# Patient Record
Sex: FEMALE | Race: WHITE | NOT HISPANIC OR LATINO
[De-identification: names, ages, dates, MRNs, and addresses within clinical notes are randomized per-mention and may not be internally consistent; named-entity substitution may affect disease eponyms.]

---

## 2018-11-25 ENCOUNTER — TRANSCRIPTION ENCOUNTER (OUTPATIENT)
Age: 23
End: 2018-11-25

## 2020-02-20 ENCOUNTER — TRANSCRIPTION ENCOUNTER (OUTPATIENT)
Age: 25
End: 2020-02-20

## 2021-06-28 ENCOUNTER — TRANSCRIPTION ENCOUNTER (OUTPATIENT)
Age: 26
End: 2021-06-28

## 2022-01-12 ENCOUNTER — TRANSCRIPTION ENCOUNTER (OUTPATIENT)
Age: 27
End: 2022-01-12

## 2022-11-14 ENCOUNTER — EMERGENCY (EMERGENCY)
Facility: HOSPITAL | Age: 27
LOS: 0 days | Discharge: HOME | End: 2022-11-14
Attending: EMERGENCY MEDICINE | Admitting: EMERGENCY MEDICINE

## 2022-11-14 VITALS
OXYGEN SATURATION: 100 % | HEART RATE: 98 BPM | DIASTOLIC BLOOD PRESSURE: 75 MMHG | SYSTOLIC BLOOD PRESSURE: 130 MMHG | RESPIRATION RATE: 18 BRPM

## 2022-11-14 VITALS
OXYGEN SATURATION: 99 % | SYSTOLIC BLOOD PRESSURE: 185 MMHG | TEMPERATURE: 98 F | DIASTOLIC BLOOD PRESSURE: 79 MMHG | HEART RATE: 115 BPM | HEIGHT: 67 IN | WEIGHT: 220.02 LBS | RESPIRATION RATE: 18 BRPM

## 2022-11-14 DIAGNOSIS — M25.572 PAIN IN LEFT ANKLE AND JOINTS OF LEFT FOOT: ICD-10-CM
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
## 2022-11-14 PROCEDURE — 73610 X-RAY EXAM OF ANKLE: CPT | Mod: 26,LT

## 2022-11-14 PROCEDURE — 99283 EMERGENCY DEPT VISIT LOW MDM: CPT | Mod: 25

## 2022-11-14 PROCEDURE — 29515 APPLICATION SHORT LEG SPLINT: CPT

## 2022-11-14 PROCEDURE — 73620 X-RAY EXAM OF FOOT: CPT | Mod: 26,LT

## 2022-11-14 RX ORDER — IBUPROFEN 200 MG
600 TABLET ORAL ONCE
Refills: 0 | Status: COMPLETED | OUTPATIENT
Start: 2022-11-14 | End: 2022-11-14

## 2022-11-14 RX ADMIN — Medication 600 MILLIGRAM(S): at 09:19

## 2022-11-14 NOTE — ED PROVIDER NOTE - OBJECTIVE STATEMENT
27-year-old female no past medical history presents with left ankle pain..  Patient states that around 3 AM last night she tripped going down the stairs.  Landed onto her left ankle and felt a pop sensation.  Ever since has been unable to ambulate on the left ankle.  Denies any other falls or trauma.  No head trauma no neck or back pain.  Took 500 mg of naproxen with minimal relief.

## 2022-11-14 NOTE — ED PROVIDER NOTE - CARE PROVIDER_API CALL
[facility])  Orthopaedic Surgery  [address] [place] [address]  Phone: [phone]  Fax: [phone]  Follow Up Time: 1-3 Days

## 2022-11-14 NOTE — ED PROVIDER NOTE - NSFOLLOWUPINSTRUCTIONS_ED_ALL_ED_FT
Follow-up with orthopedics in 1-3 days regarding your visit to the ED and for further evaluation and management. You can continue taking Naproxen at home for pain.     Ankle Sprain    An ankle sprain is a stretch or tear in a ligament in the ankle. Ligaments are tissues that connect bones to each other.    The two most common types of ankle sprains are:  Inversion sprain. This happens when the foot turns inward and the ankle rolls outward. It affects the ligament on the outside of the foot (lateral ligament).  Eversion sprain. This happens when the foot turns outward and the ankle rolls inward. It affects the ligament on the inner side of the foot (medial ligament).  What are the causes?  This condition is often caused by accidentally rolling or twisting the ankle.    What increases the risk?  This condition is more likely to develop in people who play sports.    What are the signs or symptoms?  Symptoms of this condition include:  Pain in your ankle.  Swelling.  Bruising. Bruising may develop right after you sprain your ankle or 1–2 days later.  Trouble standing or walking, especially when you turn or change directions.  How is this diagnosed?  This condition is diagnosed with a physical exam. During the exam, your health care provider will press on certain parts of your foot and ankle and try to move them in certain ways. X-rays may be taken to see how severe the sprain is and to check for broken bones.    How is this treated?  This condition may be treated with:  A brace. This is used to keep the ankle from moving until it heals.  An elastic bandage. This is used to support the ankle.  Crutches.  Pain medicine.  Surgery. This may be needed if the sprain is severe.  Physical therapy. This may help to improve the range of motion in the ankle.  Follow these instructions at home:    Rest your ankle.  Take over-the-counter and prescription medicines only as told by your health care provider.  For 2–3 days, keep your ankle raised (elevated) above the level of your heart as much as possible.  If directed, apply ice to the area:  Put ice in a plastic bag.  Place a towel between your skin and the bag.  Leave the ice on for 20 minutes, 2–3 times a day.  If you were given a brace:  Wear it as directed.  Remove it to shower or bathe.  Try not to move your ankle much, but wiggle your toes from time to time. This helps to prevent swelling.  If you were given an elastic bandage (dressing):  Remove it to shower or bathe.  Try not to move your ankle much, but wiggle your toes from time to time. This helps to prevent swelling.  Adjust the dressing to make it more comfortable if it feels too tight.  Loosen the dressing if you have numbness or tingling in your foot, or if your foot becomes cold and blue.  If you have crutches, use them as told by your health care provider.  Contact a health care provider if:  You have rapidly increasing bruising or swelling.  Your pain is not relieved with medicine.  Get help right away if:  Your foot or toes become numb or blue.  You have severe pain that gets worse.  Summary  An ankle sprain is a stretch or tear in a ligament in the ankle. Ligaments are tissues that connect bones to each other.  To relieve pain and swelling, place ice on the affected ankle, raise your ankle above the level of your heart, and use an elastic bandage. Also, rest as told by your health care provider.  This information is not intended to replace advice given to you by your health care provider. Make sure you discuss any questions you have with your health care provider.

## 2022-11-14 NOTE — ED PROVIDER NOTE - ATTENDING CONTRIBUTION TO CARE
27-year-old female no past medical history presents with left ankle pain..  Patient states that around 3 AM last night she tripped going down the stairs.  Landed onto her left ankle and felt a pop sensation.  Ever since has been unable to ambulate on the left ankle.  Denies any other falls or trauma.  No head trauma no neck or back pain.  Took 500 mg of naproxen with minimal relief.    CONSTITUTIONAL: Well-developed; well-nourished; in no acute distress.   SKIN: warm, dry  HEAD: Normocephalic; atraumatic.  EYES: PERRL, EOMI, no conjunctival erythema  ENT: No nasal discharge; airway clear.  NECK: Supple; non tender.  EXT: Positive tenderness to the medial lateral aspect of the left ankle.  Positive edema.  No erythema.  5 out of 5 strength 2+ pulses  LYMPH: No acute cervical adenopathy.  NEURO: Alert, oriented, grossly unremarkable. neurovascularly intact  PSYCH: Cooperative, appropriate.

## 2022-11-14 NOTE — ED PROVIDER NOTE - CLINICAL SUMMARY MEDICAL DECISION MAKING FREE TEXT BOX
Patient presents with left ankle pain and swelling.  X-ray done.  No fractures noted.  Motrin given.  Posterior splint applied and crutches.  Discharged with Ortho follow-up and return precautions.

## 2022-11-14 NOTE — ED PROVIDER NOTE - PHYSICAL EXAMINATION
VITAL SIGNS: I have reviewed nursing notes and confirm.  CONSTITUTIONAL: Well-appearing, non-toxic, in NAD  SKIN: Warm dry, normal skin turgor  HEAD: NCAT  EYES: No conjunctival injection, scleral anicteric  ENT: Moist mucous membranes  NECK: Supple; full ROM.  EXT: tenderness and swelling to the lateral malleolus without any overlying bruising or skin changes, no obvious dislocation or deformity, minimal tenderness over anterior ankle bones, no MT tenderness.   NEURO: Normal motor, normal sensory. CN II-XII grossly intact. Normal gait.  PSYCH: Cooperative, appropriate.

## 2022-11-14 NOTE — ED PROVIDER NOTE - PATIENT PORTAL LINK FT
You can access the FollowMyHealth Patient Portal offered by [facility] by registering at the following website: [email or web address]. By joining [facility]’s FollowMyHealth portal, you will also be able to view your health information using other applications (apps) compatible with our system.

## 2022-11-15 ENCOUNTER — APPOINTMENT (OUTPATIENT)
Dept: ORTHOPEDIC SURGERY | Facility: CLINIC | Age: 27
End: 2022-11-15

## 2022-11-15 ENCOUNTER — NON-APPOINTMENT (OUTPATIENT)
Age: 27
End: 2022-11-15

## 2022-11-15 VITALS — HEIGHT: 69 IN | BODY MASS INDEX: 34.07 KG/M2 | WEIGHT: 230 LBS

## 2022-11-15 DIAGNOSIS — S93.492A SPRAIN OF OTHER LIGAMENT OF LEFT ANKLE, INITIAL ENCOUNTER: ICD-10-CM

## 2022-11-15 PROCEDURE — 29580 STRAPPING UNNA BOOT: CPT | Mod: LT

## 2022-11-15 PROCEDURE — 99203 OFFICE O/P NEW LOW 30 MIN: CPT | Mod: 25

## 2022-11-15 NOTE — DISCUSSION/SUMMARY
[de-identified] :   I reviewed the x-rays.  Today she was placed into an Unna boot.  She will remain nonweightbearing with crutches.  I recommend she continues the Naprosyn.  She can use Tylenol for breakthrough pain.  I will see her back in a week for further evaluation.  If she is  over the malleolar zone and lateral aspect of the ankle I would the x-ray her ankle.\par \par Supervising physician:  Dr. Fofana

## 2022-11-15 NOTE — DATA REVIEWED
[Left] : left [Ankle] : ankle [FreeTextEntry1] :  Three views left ankle on 11/14/2022 at [facility] were reviewed:  X-rays show soft tissue swelling noted laterally.  No fracture.  No soft tissue calcifications.  Ankle mortise is well-preserved.

## 2022-11-15 NOTE — HISTORY OF PRESENT ILLNESS
[de-identified] : The patient is a 27-year-old female accompanied by her mother and sister here for evaluation of her left ankle.  She had a fall on 11/13/2022, she fell down the stairs, she fell down about 2 stairs and set on her left ankle.  She was seen and evaluated at [facility] on 11/14/2022 and had x-rays of the left ankle and foot that were read as negative for fracture.  She points over the malleolar zone and the lateral aspect of the ankle as to where her pain is.  She is using crutches to ambulate.  She was given Naprosyn from the ER.

## 2022-11-22 ENCOUNTER — NON-APPOINTMENT (OUTPATIENT)
Age: 27
End: 2022-11-22

## 2022-11-22 ENCOUNTER — APPOINTMENT (OUTPATIENT)
Dept: ORTHOPEDIC SURGERY | Facility: CLINIC | Age: 27
End: 2022-11-22

## 2022-11-22 PROCEDURE — 99213 OFFICE O/P EST LOW 20 MIN: CPT

## 2022-11-22 NOTE — DISCUSSION/SUMMARY
[de-identified] : Today the Unna boot was discontinued.  She was placed into an Aircast.  She will remain nonweightbearing with crutches for 1 more week.  I will see her back in 3 weeks for further evaluation.  She will do warm water soaks with Epsom salt.\par Supervising physician:  Dr. Fofana

## 2022-11-22 NOTE — HISTORY OF PRESENT ILLNESS
[de-identified] : The patient is a 27-year-old female accompanied by her mother and sister here for evaluation of her left ankle.  She had a fall on 11/13/2022, she fell down the stairs, she fell down about 2 stairs and set on her left ankle.  She was seen and evaluated at [facility] on 11/14/2022 and had x-rays of the left ankle and foot that were read as negative for fracture.   She has been in a Unna boot and ambulating with crutches.  Her pain has decreased in severity.

## 2022-11-22 NOTE — PHYSICAL EXAM
[Left] : left foot and ankle [2+] : dorsalis pedis pulse: 2+ [] : ambulation with crutches [FreeTextEntry3] :   Mild edema noted over the lateral malleolus.  No ecchymosis.  No erythema. [FreeTextEntry8] :   No tenderness to palpation over the medial malleolus.  No tenderness to palpation over the ATFL, CFL, PTFL, deltoid ligament.  Mild tenderness to palpation over the lateral malleolus. [FreeTextEntry9] :   Decreased range of motion with dorsiflexion, plantar flexion, inversion and eversion of the left ankle.

## 2022-11-28 ENCOUNTER — APPOINTMENT (OUTPATIENT)
Dept: ORTHOPEDIC SURGERY | Facility: CLINIC | Age: 27
End: 2022-11-28

## 2022-11-28 ENCOUNTER — NON-APPOINTMENT (OUTPATIENT)
Age: 27
End: 2022-11-28

## 2022-11-28 PROCEDURE — 99213 OFFICE O/P EST LOW 20 MIN: CPT

## 2022-11-28 RX ORDER — NORETHINDRONE ACETATE AND ETHINYL ESTRADIOL AND FERROUS FUMARATE 1MG-20(21)
1-20 KIT ORAL
Qty: 28 | Refills: 0 | Status: ACTIVE | COMMUNITY
Start: 2022-06-11

## 2022-11-28 NOTE — PHYSICAL EXAM
[Left] : left foot and ankle [2+] : dorsalis pedis pulse: 2+ [] : ambulation with crutches [FreeTextEntry3] :   Mild edema noted over the lateral malleolus.  No ecchymosis.  No erythema. [FreeTextEntry8] :   She has tenderness to palpation over the deltoid ligament and the PTFL.  Mild tenderness to palpation over the medial malleolus.  No tenderness to palpation over the lateral malleolus.  No tenderness to palpation over the ATFL or CFL. [FreeTextEntry9] :   Decreased range of motion with dorsiflexion, plantar flexion, inversion and eversion of the left ankle.

## 2022-11-28 NOTE — DISCUSSION/SUMMARY
[de-identified] : Today she will go back on naproxen.  Rx sent to the pharmacy.  She can use Tylenol for breakthrough pain.  Today she was put into a tall Cam walker boot.  She can weightbear as tolerated.  She understands the 1st 2 weeks of an ankle sprain of the worst and then she will start to improve.  I will see her back in 3 weeks for further evaluation.  A note was provided her to remain out of work until her next office visit.\par \par Supervising physician:  Dr. Fofana

## 2022-11-28 NOTE — HISTORY OF PRESENT ILLNESS
[de-identified] : The patient is a 27-year-old female accompanied by her mother and sister here for  subsequent re-evaluation of her left ankle.  She had a fall on 11/13/2022, she fell down the stairs, she fell down about 2 stairs and sat on her left ankle.  She was seen and evaluated at [facility] on 11/14/2022 and had x-rays of the left ankle and foot that were read as negative for fracture.   She has been in an aircast and ambulating with crutches.  She is using Tylenol for pain.

## 2022-12-19 ENCOUNTER — APPOINTMENT (OUTPATIENT)
Dept: ORTHOPEDIC SURGERY | Facility: CLINIC | Age: 27
End: 2022-12-19

## 2022-12-19 PROCEDURE — 99213 OFFICE O/P EST LOW 20 MIN: CPT

## 2022-12-19 NOTE — HISTORY OF PRESENT ILLNESS
[de-identified] : The patient is a 27-year-old female accompanied by her mother and sister here for  subsequent re-evaluation of her left ankle.  She had a fall on 11/13/2022, she fell down the stairs, she fell down about 2 stairs and sat on her left ankle.  She was seen and evaluated at [facility] on 11/14/2022 and had x-rays of the left ankle and foot that were read as negative for fracture.   She has been in an aircast and ambulating with crutches.  She is using Tylenol for pain.

## 2022-12-19 NOTE — DISCUSSION/SUMMARY
[de-identified] : At this time recommend formal physical therapy for the left ankle, Rx provided for that.  She may weightbear as tolerated in the Cam walker boot.  I will see her back in 4 weeks for further evaluation, if she is still symptomatic at that point I would recommend further imaging studies.\par \par Supervising physician:  Dr. Fofana

## 2022-12-28 ENCOUNTER — RX RENEWAL (OUTPATIENT)
Age: 27
End: 2022-12-28

## 2023-01-06 ENCOUNTER — NON-APPOINTMENT (OUTPATIENT)
Age: 28
End: 2023-01-06

## 2023-01-30 ENCOUNTER — NON-APPOINTMENT (OUTPATIENT)
Age: 28
End: 2023-01-30

## 2023-01-30 ENCOUNTER — RX RENEWAL (OUTPATIENT)
Age: 28
End: 2023-01-30

## 2023-01-30 ENCOUNTER — APPOINTMENT (OUTPATIENT)
Dept: ORTHOPEDIC SURGERY | Facility: CLINIC | Age: 28
End: 2023-01-30
Payer: MEDICAID

## 2023-01-30 PROCEDURE — 99213 OFFICE O/P EST LOW 20 MIN: CPT

## 2023-01-30 PROCEDURE — 73610 X-RAY EXAM OF ANKLE: CPT | Mod: LT

## 2023-01-30 NOTE — HISTORY OF PRESENT ILLNESS
[de-identified] : Patient is a 27-year-old female who reports to the office for subsequent reevaluation of her left ankle pain.  She had an MRI done recently and will like to go over the results of that.  She states the pain has been getting better.  She still experiences pain and swelling intermittently.  Walking, certain range of motion, and palpating certain areas of the ankle aggravate the patient's pain.  She has been taking naproxen which is given her some relief.  Denies any numbness or tingling.

## 2023-01-30 NOTE — IMAGING
[de-identified] : Weightbearing x-rays taken of the patient's left ankle in the office today revealed a well-healed nondisplaced posterior malleolus fracture.\par \par .  Left ankle MRI reviewed with the patient in detail.  It revealed a well-healed nondisplaced posterior malleolus fracture as well as high history of high ankle injury and chondral defect

## 2023-01-30 NOTE — DISCUSSION/SUMMARY
[de-identified] : Patient may continue to take naproxen as needed for pain.  She will continue physical therapy as directed.  The foot/ankle conditioning program from the [facility] was given to the patient so they may try that at home.\par \par She may continue to weight-bear as tolerated and comfortable shoes or sneakers.  Gentle ROM exercises and Epson salt and warm water was encouraged.  The patient was advised to rest/ice the area.  They may alternate with warm compresses as needed. \par \par She will return to work in about a week's time.  I advised her that she may use her ankle Aircast for support/stability while working.  The patient will follow-up in 6 weeks for further evaluation.  All of the patient's questions/concerns were answered in detail.\par \par The patient was seen under the supervision of Dr. Watsno.

## 2023-02-27 ENCOUNTER — RX RENEWAL (OUTPATIENT)
Age: 28
End: 2023-02-27

## 2023-03-16 ENCOUNTER — APPOINTMENT (OUTPATIENT)
Dept: ORTHOPEDIC SURGERY | Facility: CLINIC | Age: 28
End: 2023-03-16
Payer: MEDICAID

## 2023-03-16 DIAGNOSIS — S93.492D SPRAIN OF OTHER LIGAMENT OF LEFT ANKLE, SUBSEQUENT ENCOUNTER: ICD-10-CM

## 2023-03-16 PROCEDURE — 99213 OFFICE O/P EST LOW 20 MIN: CPT

## 2023-03-16 NOTE — HISTORY OF PRESENT ILLNESS
[de-identified] : Patient is a 27-year-old female who reports to the office for subsequent reevaluation of her left ankle pain.  She states that she has been doing physical therapy which has been giving her lots of relief.  Certain range of motion and palpating certain areas of the ankle aggravate the patient's knee pain at times.  She states that her pain level has decreased tremendously and that is at a 1 out of 10 on the pain scale.  She takes naproxen as needed for pain which also gives her relief.  Denies any numbness or tingling.

## 2023-03-16 NOTE — PHYSICAL EXAM
[Left] : left foot and ankle [5___] : [facility] 5[unfilled]/5 [2+] : posterior tibialis pulse: 2+ [] : non-antalgic [FreeTextEntry3] : Minimal lateral ankle swelling noted [FreeTextEntry8] : Mild TTP over lateral mal and lateral ligaments [FreeTextEntry9] : Mild discomfort with ankle ROM

## 2023-03-16 NOTE — DISCUSSION/SUMMARY
[de-identified] : Patient's pain has improved significantly since her last visit.  Physical therapy has helped with her ROM and strength.  The foot/ankle conditioning program from the [facility] was given to the patient so they may try that at home.\par \par Gentle ROM exercises and Epson salt and warm water was encouraged. She will continue to take naproxen as needed for pain.  The patient will follow-up on an as-needed basis.  All of the patient's questions/concerns were answered in detail.\par \par The patient was seen under the supervision of Dr. Watson.

## 2023-03-26 ENCOUNTER — RX RENEWAL (OUTPATIENT)
Age: 28
End: 2023-03-26

## 2023-03-26 RX ORDER — NAPROXEN 500 MG/1
500 TABLET ORAL
Qty: 60 | Refills: 0 | Status: ACTIVE | COMMUNITY
Start: 2022-11-28 | End: 1900-01-01

## 2023-12-11 ENCOUNTER — NON-APPOINTMENT (OUTPATIENT)
Age: 28
End: 2023-12-11

## 2024-02-03 ENCOUNTER — NON-APPOINTMENT (OUTPATIENT)
Age: 29
End: 2024-02-03

## 2024-04-02 ENCOUNTER — NON-APPOINTMENT (OUTPATIENT)
Age: 29
End: 2024-04-02

## 2024-04-16 ENCOUNTER — NON-APPOINTMENT (OUTPATIENT)
Age: 29
End: 2024-04-16

## 2024-06-24 ENCOUNTER — NON-APPOINTMENT (OUTPATIENT)
Age: 29
End: 2024-06-24

## 2025-01-09 ENCOUNTER — NON-APPOINTMENT (OUTPATIENT)
Age: 30
End: 2025-01-09

## 2025-02-18 ENCOUNTER — NON-APPOINTMENT (OUTPATIENT)
Age: 30
End: 2025-02-18